# Patient Record
Sex: MALE | Race: WHITE | NOT HISPANIC OR LATINO | Employment: OTHER | URBAN - METROPOLITAN AREA
[De-identification: names, ages, dates, MRNs, and addresses within clinical notes are randomized per-mention and may not be internally consistent; named-entity substitution may affect disease eponyms.]

---

## 2019-05-06 ENCOUNTER — PREPPED CHART (OUTPATIENT)
Dept: URBAN - METROPOLITAN AREA CLINIC 25 | Facility: CLINIC | Age: 74
End: 2019-05-06

## 2019-08-12 NOTE — PATIENT DISCUSSION
CATARACTS, OU: VISUALLY SIGNIFICANT. SURGERY INDICATED OD FIRST THEN OS. PATIENT WISHES TO WAIT AT THIS TIME. GLASSES PRESCRIPTION GIVEN. PATIENT TO CALL BACK IF THEY DECIDE TO PROCEED WITH SURGERY WITHIN 6 MONTHS. OTHERWISE, FOLLOW AS SCHEDULED.

## 2020-06-01 NOTE — PATIENT DISCUSSION
order [facility] 24-2 and color vision prior to cataract surgery.

## 2020-06-03 NOTE — PATIENT DISCUSSION
HVF performed and reviewed by BRIANA: Severe decreased sensitivity OD, possible inferior defect OS.  Baseline exam.

## 2020-12-21 ASSESSMENT — TONOMETRY
OD_IOP_MMHG: 13
OS_IOP_MMHG: 14

## 2020-12-28 ENCOUNTER — ESTABLISHED COMPREHENSIVE EXAM (OUTPATIENT)
Dept: URBAN - METROPOLITAN AREA CLINIC 25 | Facility: CLINIC | Age: 75
End: 2020-12-28

## 2020-12-28 DIAGNOSIS — H52.13: ICD-10-CM

## 2020-12-28 PROCEDURE — 92499RSE RETINAL SCREENING ELECTIVE

## 2020-12-28 PROCEDURE — 92015FME REFRACTION-FME

## 2020-12-28 PROCEDURE — 92014 COMPRE OPH EXAM EST PT 1/>: CPT

## 2020-12-28 ASSESSMENT — VISUAL ACUITY
OD_SC: 20/25
OS_SC: 20/100

## 2020-12-28 ASSESSMENT — TONOMETRY
OD_IOP_MMHG: 13
OS_IOP_MMHG: 15

## 2021-12-29 ENCOUNTER — ESTABLISHED PATIENT (OUTPATIENT)
Dept: URBAN - METROPOLITAN AREA CLINIC 25 | Facility: CLINIC | Age: 76
End: 2021-12-29

## 2021-12-29 DIAGNOSIS — Z96.1: ICD-10-CM

## 2021-12-29 DIAGNOSIS — H52.13: ICD-10-CM

## 2021-12-29 DIAGNOSIS — H52.223: ICD-10-CM

## 2021-12-29 PROCEDURE — 92014 COMPRE OPH EXAM EST PT 1/>: CPT

## 2021-12-29 PROCEDURE — 92499RSE RETINAL SCREENING ELECTIVE

## 2021-12-29 PROCEDURE — 92015 DETERMINE REFRACTIVE STATE: CPT

## 2021-12-29 ASSESSMENT — VISUAL ACUITY
OD_SC: 20/30
OS_SC: 20/80

## 2021-12-29 ASSESSMENT — KERATOMETRY
OD_AXISANGLE2_DEGREES: 9
OS_AXISANGLE2_DEGREES: 7
OD_AXISANGLE_DEGREES: 99
OS_K1POWER_DIOPTERS: 41.25
OS_AXISANGLE_DEGREES: 97
OD_K1POWER_DIOPTERS: 41.25
OS_K2POWER_DIOPTERS: 42.75
OD_K2POWER_DIOPTERS: 42.75

## 2021-12-29 ASSESSMENT — TONOMETRY
OS_IOP_MMHG: 11
OD_IOP_MMHG: 10

## 2022-03-30 NOTE — PATIENT DISCUSSION
Pt is undecided and will need to sign reid on day of surgery. your pmd in 1-3 days,   Phone: (   )    -  Fax: (   )    -  Follow Up Time:

## 2022-09-26 ENCOUNTER — EMERGENCY VISIT (OUTPATIENT)
Dept: URBAN - METROPOLITAN AREA CLINIC 25 | Facility: CLINIC | Age: 77
End: 2022-09-26

## 2022-09-26 DIAGNOSIS — H11.31: ICD-10-CM

## 2022-09-26 PROCEDURE — 99213 OFFICE O/P EST LOW 20 MIN: CPT

## 2022-09-26 ASSESSMENT — KERATOMETRY
OS_AXISANGLE2_DEGREES: 7
OD_K1POWER_DIOPTERS: 41.25
OS_K2POWER_DIOPTERS: 42.75
OS_AXISANGLE_DEGREES: 97
OD_AXISANGLE_DEGREES: 99
OD_AXISANGLE2_DEGREES: 9
OS_K1POWER_DIOPTERS: 41.25
OD_K2POWER_DIOPTERS: 42.75

## 2022-09-26 ASSESSMENT — VISUAL ACUITY
OS_SC: 20/70
OD_SC: 20/25

## 2023-08-16 ENCOUNTER — EMERGENCY VISIT (OUTPATIENT)
Dept: URBAN - METROPOLITAN AREA CLINIC 25 | Facility: CLINIC | Age: 78
End: 2023-08-16

## 2023-08-16 DIAGNOSIS — H10.45: ICD-10-CM

## 2023-08-16 DIAGNOSIS — H04.222: ICD-10-CM

## 2023-08-16 PROCEDURE — 99213 OFFICE O/P EST LOW 20 MIN: CPT

## 2023-08-16 ASSESSMENT — KERATOMETRY
OS_K1POWER_DIOPTERS: 41.25
OD_K2POWER_DIOPTERS: 42.75
OD_AXISANGLE2_DEGREES: 9
OD_K1POWER_DIOPTERS: 41.25
OD_K1POWER_DIOPTERS: 41.50
OD_AXISANGLE2_DEGREES: 5
OS_K1POWER_DIOPTERS: 41.50
OD_K2POWER_DIOPTERS: 42.50
OS_K2POWER_DIOPTERS: 42.75
OS_AXISANGLE_DEGREES: 92
OS_AXISANGLE2_DEGREES: 2
OS_K2POWER_DIOPTERS: 43.00
OS_AXISANGLE_DEGREES: 97
OD_AXISANGLE_DEGREES: 99
OS_AXISANGLE2_DEGREES: 7
OD_AXISANGLE_DEGREES: 95

## 2023-08-16 ASSESSMENT — VISUAL ACUITY
OS_SC: 20/100
OD_SC: 20/25

## 2024-05-17 ENCOUNTER — ESTABLISHED PATIENT (OUTPATIENT)
Dept: URBAN - METROPOLITAN AREA CLINIC 25 | Facility: CLINIC | Age: 79
End: 2024-05-17

## 2024-05-17 DIAGNOSIS — H52.13: ICD-10-CM

## 2024-05-17 PROCEDURE — 92014 COMPRE OPH EXAM EST PT 1/>: CPT

## 2024-05-17 PROCEDURE — 92015 DETERMINE REFRACTIVE STATE: CPT

## 2024-05-17 PROCEDURE — 92499RS OCT RETINAL SCREENING, ELECTIVE

## 2024-05-17 ASSESSMENT — KERATOMETRY
OS_AXISANGLE_DEGREES: 97
OD_AXISANGLE_DEGREES: 95
OD_K2POWER_DIOPTERS: 42.75
OS_K1POWER_DIOPTERS: 41.50
OS_K2POWER_DIOPTERS: 43.00
OD_K1POWER_DIOPTERS: 41.00
OD_AXISANGLE2_DEGREES: 5
OS_K1POWER_DIOPTERS: 41.25
OS_AXISANGLE_DEGREES: 92
OS_K2POWER_DIOPTERS: 42.75
OD_K1POWER_DIOPTERS: 41.50
OD_K1POWER_DIOPTERS: 41.25
OD_AXISANGLE_DEGREES: 99
OS_AXISANGLE2_DEGREES: 9
OS_AXISANGLE_DEGREES: 99
OD_AXISANGLE2_DEGREES: 9
OS_AXISANGLE2_DEGREES: 2
OD_K2POWER_DIOPTERS: 42.50
OS_AXISANGLE2_DEGREES: 7
OD_AXISANGLE_DEGREES: 96
OD_AXISANGLE2_DEGREES: 6

## 2024-05-17 ASSESSMENT — VISUAL ACUITY
OU_SC: J3
OS_SC: 20/200
OD_SC: 20/25

## 2024-05-17 ASSESSMENT — TONOMETRY
OS_IOP_MMHG: 12
OD_IOP_MMHG: 11

## 2024-08-16 ENCOUNTER — EMERGENCY VISIT (OUTPATIENT)
Dept: URBAN - METROPOLITAN AREA CLINIC 25 | Facility: CLINIC | Age: 79
End: 2024-08-16

## 2024-08-16 DIAGNOSIS — H11.31: ICD-10-CM

## 2024-08-16 PROCEDURE — 99212 OFFICE O/P EST SF 10 MIN: CPT

## 2024-08-16 ASSESSMENT — KERATOMETRY
OS_AXISANGLE2_DEGREES: 7
OS_K1POWER_DIOPTERS: 41.25
OD_AXISANGLE2_DEGREES: 9
OD_K1POWER_DIOPTERS: 41.50
OD_AXISANGLE_DEGREES: 96
OD_AXISANGLE_DEGREES: 95
OD_AXISANGLE2_DEGREES: 5
OS_AXISANGLE_DEGREES: 97
OD_AXISANGLE2_DEGREES: 6
OD_K1POWER_DIOPTERS: 41.25
OS_K2POWER_DIOPTERS: 43.00
OD_K2POWER_DIOPTERS: 42.50
OS_AXISANGLE_DEGREES: 99
OD_AXISANGLE_DEGREES: 99
OS_K1POWER_DIOPTERS: 41.50
OS_AXISANGLE_DEGREES: 92
OD_K2POWER_DIOPTERS: 42.75
OS_AXISANGLE2_DEGREES: 2
OD_K1POWER_DIOPTERS: 41.00
OS_AXISANGLE2_DEGREES: 9
OS_K2POWER_DIOPTERS: 42.75

## 2024-08-16 ASSESSMENT — VISUAL ACUITY
OS_SC: J1+
OD_SC: 20/20-1

## 2025-05-21 ENCOUNTER — COMPREHENSIVE EXAM (OUTPATIENT)
Age: 80
End: 2025-05-21

## 2025-05-21 DIAGNOSIS — H52.13: ICD-10-CM

## 2025-05-21 DIAGNOSIS — Z96.1: ICD-10-CM

## 2025-05-21 PROCEDURE — 92499RS OCT RETINAL SCREENING, ELECTIVE

## 2025-05-21 PROCEDURE — 92014 COMPRE OPH EXAM EST PT 1/>: CPT

## 2025-05-21 PROCEDURE — 92015 DETERMINE REFRACTIVE STATE: CPT
